# Patient Record
Sex: MALE | URBAN - METROPOLITAN AREA
[De-identification: names, ages, dates, MRNs, and addresses within clinical notes are randomized per-mention and may not be internally consistent; named-entity substitution may affect disease eponyms.]

---

## 2020-02-15 ENCOUNTER — APPOINTMENT (OUTPATIENT)
Dept: RADIOLOGY | Age: 75
End: 2020-02-15
Payer: COMMERCIAL

## 2020-02-15 ENCOUNTER — OFFICE VISIT (OUTPATIENT)
Dept: URGENT CARE | Age: 75
End: 2020-02-15
Payer: COMMERCIAL

## 2020-02-15 VITALS
RESPIRATION RATE: 18 BRPM | DIASTOLIC BLOOD PRESSURE: 90 MMHG | OXYGEN SATURATION: 97 % | HEIGHT: 63 IN | SYSTOLIC BLOOD PRESSURE: 138 MMHG | HEART RATE: 71 BPM | TEMPERATURE: 98.5 F

## 2020-02-15 DIAGNOSIS — R05.9 COUGH: ICD-10-CM

## 2020-02-15 DIAGNOSIS — R05.9 COUGH: Primary | ICD-10-CM

## 2020-02-15 DIAGNOSIS — J40 BRONCHITIS: ICD-10-CM

## 2020-02-15 PROCEDURE — 93005 ELECTROCARDIOGRAM TRACING: CPT | Performed by: PHYSICIAN ASSISTANT

## 2020-02-15 PROCEDURE — 71046 X-RAY EXAM CHEST 2 VIEWS: CPT

## 2020-02-15 PROCEDURE — G0383 LEV 4 HOSP TYPE B ED VISIT: HCPCS | Performed by: PHYSICIAN ASSISTANT

## 2020-02-15 RX ORDER — DOXYCYCLINE 100 MG/1
100 TABLET ORAL 2 TIMES DAILY
Qty: 14 TABLET | Refills: 0 | Status: SHIPPED | OUTPATIENT
Start: 2020-02-15 | End: 2020-02-22

## 2020-02-15 NOTE — PROGRESS NOTES
[id number] [address]        NAME: Delmi Stoddard is a 76 y o  male  : 1945    MRN: [id number]  DATE: February 15, 2020  TIME: 7:30 PM    Assessment and Plan   Cough [R05]  1  Cough  XR chest pa & lateral   2  Bronchitis  doxycycline (ADOXA) 100 MG tablet         Patient Instructions       I let them know that even if the EKG is normal I cannot completely rule out cardiac exam he would need to go to the ER  The EKG did show possible left atrial enlargement and septal infarct  I shared this with the family and recommend her go to ER for further work up  They do not want to take him to the ER  I prescribed antibiotics for the cough  They state they were only taken to the ER if anything worsens  Follow up with PCP in 3-5 days  Proceed to  ER if symptoms worsen  Chief Complaint     Chief Complaint   Patient presents with    Cough     Symptoms have been ongoing on/off for approx  1 month  (Hx (3 yrs ) of CVA with L-sided weakness)  Cough is nonproductive  Denies SOB/CHAVEZ or fevers  No OTC medications given   Chest Congestion     Denies any recent travel or incontact with anyone sick   Abdominal Pain     L-Sided ABD pain  No change in appetite  History of Present Illness       Patient presents with his wife son and family friend for evaluation of a cough, chest pain that has been ongoing for the past month  They states that comes and goes  He currently is not having chest pain were cough today  They state is not worsening  He denies any cardiac history  He denies any fevers or chills  He denies any symptoms today  The family states they want a full workup  They are requesting an EKG and chest x-ray and ultrasound and lab work  Review of Systems   Review of Systems   Constitutional: Negative  HENT: Negative  Respiratory: Positive for cough  Negative for shortness of breath, wheezing and stridor  Cardiovascular: Positive for chest pain  Gastrointestinal: Negative  Musculoskeletal: Negative  Neurological: Negative  Psychiatric/Behavioral: Negative  Current Medications       Current Outpatient Medications:     doxycycline (ADOXA) 100 MG tablet, Take 1 tablet (100 mg total) by mouth 2 (two) times a day for 7 days, Disp: 14 tablet, Rfl: 0    Current Allergies     Allergies as of 02/15/2020    (No Known Allergies)            The following portions of the patient's history were reviewed and updated as appropriate: allergies, current medications, past family history, past medical history, past social history, past surgical history and problem list      Past Medical History:   Diagnosis Date    Hypertension     Stroke [facility])        Past Surgical History:   Procedure Laterality Date    ABDOMINAL SURGERY         No family history on file  Medications have been verified  Objective   /90 (BP Location: Right arm, Patient Position: Sitting, Cuff Size: Standard)   Pulse 71   Temp 98 5 °F (36 9 °C) (Temporal)   Resp 18   Ht 5' 3" (1 6 m)   SpO2 97%        Physical Exam     Physical Exam   Constitutional: He is oriented to person, place, and time  He appears well-developed and well-nourished  Non-toxic appearance  He does not appear ill  No distress  HENT:   Head: Normocephalic and atraumatic  Mouth/Throat: Oropharynx is clear and moist  No oropharyngeal exudate  Cardiovascular: Normal rate and regular rhythm  Pulmonary/Chest: Effort normal and breath sounds normal  No stridor  No respiratory distress  He has no wheezes  He has no rhonchi  He has no rales  He exhibits no tenderness  Abdominal: Soft  Normal appearance and bowel sounds are normal  There is no tenderness  Neurological: He is alert and oriented to person, place, and time  Patient has a stroke and has a history of left sided hemiparesis  Skin: Skin is warm and dry  Psychiatric: He has a normal mood and affect   His behavior is normal    Nursing note and vitals reviewed

## 2020-02-16 LAB
ATRIAL RATE: 61 BPM
P AXIS: 64 DEGREES
P AXIS: 64 DEGREES
P AXIS: 72 DEGREES
PR INTERVAL: 174 MS
PR INTERVAL: 176 MS
PR INTERVAL: 176 MS
QRS AXIS: -23 DEGREES
QRS AXIS: -25 DEGREES
QRS AXIS: -29 DEGREES
QRSD INTERVAL: 74 MS
QRSD INTERVAL: 82 MS
QRSD INTERVAL: 84 MS
QT INTERVAL: 424 MS
QT INTERVAL: 428 MS
QT INTERVAL: 428 MS
QTC INTERVAL: 426 MS
QTC INTERVAL: 430 MS
QTC INTERVAL: 430 MS
T WAVE AXIS: 39 DEGREES
T WAVE AXIS: 41 DEGREES
T WAVE AXIS: 44 DEGREES
VENTRICULAR RATE: 61 BPM

## 2020-02-16 PROCEDURE — 93010 ELECTROCARDIOGRAM REPORT: CPT | Performed by: INTERNAL MEDICINE

## 2020-03-26 DIAGNOSIS — J40 BRONCHITIS: ICD-10-CM

## 2020-03-26 RX ORDER — DOXYCYCLINE HYCLATE 100 MG
TABLET ORAL
Qty: 14 TABLET | Refills: 0 | OUTPATIENT
Start: 2020-03-26

## 2020-03-31 DIAGNOSIS — J40 BRONCHITIS: ICD-10-CM

## 2020-04-01 RX ORDER — DOXYCYCLINE HYCLATE 100 MG
TABLET ORAL
Qty: 14 TABLET | Refills: 0 | OUTPATIENT
Start: 2020-04-01